# Patient Record
Sex: MALE | Race: BLACK OR AFRICAN AMERICAN | NOT HISPANIC OR LATINO | URBAN - METROPOLITAN AREA
[De-identification: names, ages, dates, MRNs, and addresses within clinical notes are randomized per-mention and may not be internally consistent; named-entity substitution may affect disease eponyms.]

---

## 2021-06-25 ENCOUNTER — LAB OUTSIDE AN ENCOUNTER (OUTPATIENT)
Dept: URBAN - METROPOLITAN AREA CLINIC 118 | Facility: CLINIC | Age: 46
End: 2021-06-25

## 2021-06-25 ENCOUNTER — CLAIMS CREATED FROM THE CLAIM WINDOW (OUTPATIENT)
Dept: URBAN - METROPOLITAN AREA CLINIC 118 | Facility: CLINIC | Age: 46
End: 2021-06-25
Payer: COMMERCIAL

## 2021-06-25 VITALS
SYSTOLIC BLOOD PRESSURE: 146 MMHG | TEMPERATURE: 97.9 F | DIASTOLIC BLOOD PRESSURE: 101 MMHG | HEIGHT: 71 IN | HEART RATE: 66 BPM | BODY MASS INDEX: 24.64 KG/M2 | WEIGHT: 176 LBS

## 2021-06-25 DIAGNOSIS — R11.0 NAUSEA: ICD-10-CM

## 2021-06-25 DIAGNOSIS — R10.13 EPIGASTRIC PAIN: ICD-10-CM

## 2021-06-25 DIAGNOSIS — R06.6 HICCUPS: ICD-10-CM

## 2021-06-25 DIAGNOSIS — K21.9 GASTROESOPHAGEAL REFLUX DISEASE, UNSPECIFIED WHETHER ESOPHAGITIS PRESENT: ICD-10-CM

## 2021-06-25 PROCEDURE — 99204 OFFICE O/P NEW MOD 45 MIN: CPT | Performed by: INTERNAL MEDICINE

## 2021-06-25 RX ORDER — BACLOFEN 10 MG/5ML
AS DIRECTED SOLUTION ORAL
Status: ACTIVE | COMMUNITY

## 2021-06-25 RX ORDER — ESOMEPRAZOLE MAGNESIUM 20 MG/1
1 CAPSULE CAPSULE, DELAYED RELEASE ORAL ONCE A DAY
Qty: 90 CAPSULE | Refills: 0 | OUTPATIENT
Start: 2021-06-25

## 2021-06-25 NOTE — HPI-TODAY'S VISIT:
45 yo M presents for evaluation of chronic hiccups for over 5 yrs. He initially saw PCP and was given baclofen. He also complains of epigastric "burning" pain, some heartburn, nausea, globus sensation, regurgitation. He also complains of decreased appetite, early satiety. He denies GI blood loss, vomiting, wt loss. No FH of colon/gastric cancer.

## 2021-06-25 NOTE — PHYSICAL EXAM GASTROINTESTINAL
Abdomen,  soft, epigastric TTP, nondistended,  no guarding or rigidity,  no masses palpable,  normal bowel sounds,  Liver and Spleen, no hepatosplenomegaly

## 2021-06-26 LAB
A/G RATIO: 1.9
ALBUMIN: 4.8
ALKALINE PHOSPHATASE: 93
ALT (SGPT): 21
AST (SGOT): 21
BILIRUBIN, TOTAL: 0.5
BUN/CREATININE RATIO: 10
BUN: 11
CALCIUM: 9.8
CARBON DIOXIDE, TOTAL: 27
CHLORIDE: 103
CREATININE: 1.13
EGFR IF AFRICN AM: 90
EGFR IF NONAFRICN AM: 78
GLOBULIN, TOTAL: 2.5
GLUCOSE: 80
POTASSIUM: 4.2
PROTEIN, TOTAL: 7.3
SODIUM: 142

## 2021-07-21 ENCOUNTER — OFFICE VISIT (OUTPATIENT)
Dept: URBAN - METROPOLITAN AREA SURGERY CENTER 23 | Facility: SURGERY CENTER | Age: 46
End: 2021-07-21
Payer: COMMERCIAL

## 2021-07-21 DIAGNOSIS — K29.60 ADENOPAPILLOMATOSIS GASTRICA: ICD-10-CM

## 2021-07-21 DIAGNOSIS — K31.89 ACQUIRED DEFORMITY OF DUODENUM: ICD-10-CM

## 2021-07-21 PROCEDURE — 43239 EGD BIOPSY SINGLE/MULTIPLE: CPT | Performed by: INTERNAL MEDICINE

## 2021-07-21 PROCEDURE — G8907 PT DOC NO EVENTS ON DISCHARG: HCPCS | Performed by: INTERNAL MEDICINE

## 2021-07-21 RX ORDER — ESOMEPRAZOLE MAGNESIUM 20 MG/1
1 CAPSULE CAPSULE, DELAYED RELEASE ORAL ONCE A DAY
Qty: 90 CAPSULE | Refills: 0 | Status: ACTIVE | COMMUNITY
Start: 2021-06-25

## 2021-07-21 RX ORDER — BACLOFEN 10 MG/5ML
AS DIRECTED SOLUTION ORAL
Status: ACTIVE | COMMUNITY

## 2021-08-18 ENCOUNTER — OFFICE VISIT (OUTPATIENT)
Dept: URBAN - METROPOLITAN AREA CLINIC 118 | Facility: CLINIC | Age: 46
End: 2021-08-18

## 2021-10-29 ENCOUNTER — DASHBOARD ENCOUNTERS (OUTPATIENT)
Age: 46
End: 2021-10-29

## 2021-10-29 ENCOUNTER — OFFICE VISIT (OUTPATIENT)
Dept: URBAN - METROPOLITAN AREA CLINIC 118 | Facility: CLINIC | Age: 46
End: 2021-10-29
Payer: COMMERCIAL

## 2021-10-29 ENCOUNTER — LAB OUTSIDE AN ENCOUNTER (OUTPATIENT)
Dept: URBAN - METROPOLITAN AREA CLINIC 118 | Facility: CLINIC | Age: 46
End: 2021-10-29

## 2021-10-29 DIAGNOSIS — R11.0 NAUSEA: ICD-10-CM

## 2021-10-29 DIAGNOSIS — K21.9 GASTROESOPHAGEAL REFLUX DISEASE, UNSPECIFIED WHETHER ESOPHAGITIS PRESENT: ICD-10-CM

## 2021-10-29 DIAGNOSIS — Z12.11 COLON CANCER SCREENING: ICD-10-CM

## 2021-10-29 DIAGNOSIS — R06.6 HICCUPS: ICD-10-CM

## 2021-10-29 DIAGNOSIS — R10.13 EPIGASTRIC PAIN: ICD-10-CM

## 2021-10-29 PROBLEM — 79922009: Status: ACTIVE | Noted: 2021-06-25

## 2021-10-29 PROBLEM — 235595009: Status: ACTIVE | Noted: 2021-06-25

## 2021-10-29 PROBLEM — 422587007: Status: ACTIVE | Noted: 2021-06-25

## 2021-10-29 PROBLEM — 65958008: Status: ACTIVE | Noted: 2021-06-25

## 2021-10-29 PROCEDURE — 99213 OFFICE O/P EST LOW 20 MIN: CPT | Performed by: INTERNAL MEDICINE

## 2021-10-29 RX ORDER — ESOMEPRAZOLE MAGNESIUM 20 MG/1
1 CAPSULE CAPSULE, DELAYED RELEASE ORAL ONCE A DAY
Qty: 90 CAPSULE | Refills: 0 | Status: DISCONTINUED | COMMUNITY
Start: 2021-06-25

## 2021-10-29 RX ORDER — BACLOFEN 10 MG/5ML
AS DIRECTED SOLUTION ORAL
Status: ACTIVE | COMMUNITY

## 2021-10-29 NOTE — HPI-TODAY'S VISIT:
This is a 45 yo male here for a follow up visit.  Since last visit, he had EGD, which showed gastritis. path was negative for HP.  He has been doing well on omeprazole and baclofen for his acid reflux and hiccups controlled. When he misses the dose, he noticed symptoms recur with heartburn.  Reports eating a meal just prior to going to bed daily.  No prior colonoscopy.

## 2022-01-12 ENCOUNTER — OFFICE VISIT (OUTPATIENT)
Dept: URBAN - METROPOLITAN AREA SURGERY CENTER 23 | Facility: SURGERY CENTER | Age: 47
End: 2022-01-12